# Patient Record
Sex: MALE | Race: OTHER | Employment: PART TIME | URBAN - METROPOLITAN AREA
[De-identification: names, ages, dates, MRNs, and addresses within clinical notes are randomized per-mention and may not be internally consistent; named-entity substitution may affect disease eponyms.]

---

## 2020-06-11 ENCOUNTER — HOSPITAL ENCOUNTER (EMERGENCY)
Age: 20
Discharge: HOME | End: 2020-06-11
Attending: EMERGENCY MEDICINE
Payer: OTHER MISCELLANEOUS

## 2020-06-11 VITALS
DIASTOLIC BLOOD PRESSURE: 84 MMHG | SYSTOLIC BLOOD PRESSURE: 168 MMHG | RESPIRATION RATE: 18 BRPM | WEIGHT: 315 LBS | HEIGHT: 74 IN | TEMPERATURE: 99 F | HEART RATE: 109 BPM | OXYGEN SATURATION: 98 % | BODY MASS INDEX: 40.43 KG/M2

## 2020-06-11 DIAGNOSIS — S91.011A LACERATION OF RIGHT ANKLE, INITIAL ENCOUNTER: Primary | ICD-10-CM

## 2020-06-11 PROCEDURE — 12002 RPR S/N/AX/GEN/TRNK2.6-7.5CM: CPT

## 2020-06-11 PROCEDURE — 99283 EMERGENCY DEPT VISIT LOW MDM: CPT

## 2020-06-11 PROCEDURE — 90471 IMMUNIZATION ADMIN: CPT

## 2020-06-11 RX ORDER — NAPROXEN 500 MG/1
500 TABLET ORAL 2 TIMES DAILY PRN
Qty: 20 TABLET | Refills: 0 | Status: SHIPPED | OUTPATIENT
Start: 2020-06-11 | End: 2020-06-18

## 2020-06-11 NOTE — ED NOTES
After hours drug screen testing called by this RN, patient arrived with paperwork needing exams completed. Will call back with ETA for sending someone out.

## 2020-06-11 NOTE — ED PROVIDER NOTES
Patient Seen in: [facility] [place]      History   Patient presents with:  Laceration Abrasion    Stated Complaint: right leg injured at work today;hit by steel beam    HPI    19-year-old male presents to the emerge department afte Comments: Patient has a laceration is approximately 3 cm in length that appears to have some tissue loss and contused tissue but no foreign bodies appreciated this is on the anterior portion of his right lower extremity. See picture below.    Skin: START taking these medications    naproxen 500 MG Oral Tab  Take 1 tablet (500 mg total) by mouth 2 (two) times daily as needed., Normal, Disp-20 tablet, R-0

## 2020-06-23 ENCOUNTER — APPOINTMENT (OUTPATIENT)
Dept: OTHER | Facility: HOSPITAL | Age: 20
End: 2020-06-23
Attending: PREVENTIVE MEDICINE

## 2020-06-27 ENCOUNTER — OFFICE VISIT (OUTPATIENT)
Dept: OCCUPATIONAL MEDICINE | Age: 20
End: 2020-06-27
Attending: PREVENTIVE MEDICINE

## 2020-07-01 ENCOUNTER — OFFICE VISIT (OUTPATIENT)
Dept: OCCUPATIONAL MEDICINE | Age: 20
End: 2020-07-01
Attending: FAMILY MEDICINE

## (undated) NOTE — LETTER
Date & Time: 6/11/2020, 4:58 PM  Patient: Trina Gaines  Encounter Provider(s):    Tiffani oFley MD       To Whom It May Concern:    Trina Gaines was seen and treated in our department on 6/11/2020. He should not return to work until 06/13/2020.     If you h